# Patient Record
Sex: FEMALE | Race: WHITE | URBAN - METROPOLITAN AREA
[De-identification: names, ages, dates, MRNs, and addresses within clinical notes are randomized per-mention and may not be internally consistent; named-entity substitution may affect disease eponyms.]

---

## 2022-04-12 ENCOUNTER — OFFICE VISIT (OUTPATIENT)
Dept: URBAN - METROPOLITAN AREA CLINIC 23 | Facility: CLINIC | Age: 52
End: 2022-04-12

## 2022-04-12 ENCOUNTER — OFFICE VISIT (OUTPATIENT)
Dept: URBAN - METROPOLITAN AREA CLINIC 28 | Facility: CLINIC | Age: 52
End: 2022-04-12

## 2022-04-12 DIAGNOSIS — H33.011 RETINAL DETACHMENT WITH SINGLE BREAK, RIGHT EYE: Primary | ICD-10-CM

## 2022-04-12 DIAGNOSIS — H33.031 RETINAL DETACHMENT WITH GIANT RETINAL TEAR, RIGHT EYE: ICD-10-CM

## 2022-04-12 DIAGNOSIS — H33.052 TOTAL RETINAL DETACHMENT, LEFT EYE: Primary | ICD-10-CM

## 2022-04-12 DIAGNOSIS — H25.13 AGE-RELATED NUCLEAR CATARACT, BILATERAL: ICD-10-CM

## 2022-04-12 PROCEDURE — 92134 CPTRZ OPH DX IMG PST SGM RTA: CPT | Performed by: OPTOMETRIST

## 2022-04-12 PROCEDURE — 92134 CPTRZ OPH DX IMG PST SGM RTA: CPT | Performed by: OPHTHALMOLOGY

## 2022-04-12 PROCEDURE — 92004 COMPRE OPH EXAM NEW PT 1/>: CPT | Performed by: OPTOMETRIST

## 2022-04-12 PROCEDURE — 92004 COMPRE OPH EXAM NEW PT 1/>: CPT | Performed by: OPHTHALMOLOGY

## 2022-04-12 RX ORDER — PREDNISOLONE ACETATE 10 MG/ML
1 % SUSPENSION/ DROPS OPHTHALMIC
Qty: 10 | Refills: 5 | Status: ACTIVE
Start: 2022-04-12

## 2022-04-12 RX ORDER — ALBUTEROL SULFATE 5 MG/ML
SOLUTION RESPIRATORY (INHALATION)
Qty: 0 | Refills: 0 | Status: ACTIVE
Start: 2022-04-12

## 2022-04-12 RX ORDER — OFLOXACIN 3 MG/ML
0.3 % SOLUTION/ DROPS OPHTHALMIC
Qty: 5 | Refills: 3 | Status: ACTIVE
Start: 2022-04-12

## 2022-04-12 ASSESSMENT — INTRAOCULAR PRESSURE
OS: 17
OD: 17
OD: 16

## 2022-04-12 ASSESSMENT — KERATOMETRY
OS: 43.63
OD: 43.63

## 2022-04-12 NOTE — IMPRESSION/PLAN
Impression: Retinal detachment with giant retinal tear, right eye: H33.031. Plan: Educated extensively on exam findings. Educated on RD OD and beginning of macula off presentation. Educated on importance of immediate/urgent treatment with retina.

## 2022-04-12 NOTE — IMPRESSION/PLAN
Impression: Total retinal detachment, left eye: H33.052. Plan: Educated extensively on exam findings. Educated on total retinal detachment OS and impact on vision. Immediate/urgent referral to retina.

## 2022-04-12 NOTE — IMPRESSION/PLAN
Impression: Total retinal detachment, left eye: H33.052. Left. Condition: unstable. Vision: vision affected. Longstanding RD - per pt decrease vision since end of July 2021 Plan: Discussed diagnosis in detail with patient. Discussed risks of progression. Discussed treatment options with patient. Recommend surgery to repair the Retinal Detachment Left eye however due to recent RD OD will proceed with surgery OD first, will consider surgery OS once OD is stable. OCT OS unable to obtain and Optos OS shows Total RD.

## 2022-04-12 NOTE — IMPRESSION/PLAN
Impression: Age-related nuclear cataract, bilateral: H25.13. Bilateral. Condition: new prob, no addtl w/u needed. Plan: Discussed diagnosis in detail with patient. No treatment is required at this time. Will continue to observe condition and or symptoms. Advise patient she may need Cataract surgery sooner than later post retina surgery.

## 2022-04-12 NOTE — IMPRESSION/PLAN
Impression: Retinal detachment with single break, right eye: H33.011. Right. Condition: new problem addtl w/u needed. Vision: vision affected. Plan: Discussed diagnosis in detail with patient. Discussed risks of progression. Surgical treatment is recommended to repair the retina PPVx RIGHT EYE for possible vision improvement. Discussed surgery with Gas vs S. O. Patient states she needs to be able to see. Recommend surgery OD with S. O. Surgical risks and benefits were discussed, explained and understood by patient. Unable to tell how much vision will be recovered. Advise patient that once the retina is stable post surgery, she will need additional surgery to remove the S. O. All questions answered. Patient elects to proceed with recommendation. RL1. Educational material provided to patient. OCT O D shows macula off RD and Optos OD shows sup temp RD. Erx Prednisolone and Ofloxacin. Proceed with emergency surgery OD today. Anastacio Green

## 2022-04-13 ENCOUNTER — POST-OPERATIVE VISIT (OUTPATIENT)
Dept: URBAN - METROPOLITAN AREA CLINIC 23 | Facility: CLINIC | Age: 52
End: 2022-04-13

## 2022-04-13 DIAGNOSIS — Z48.810 ENCOUNTER FOR SURGICAL AFTERCARE FOLLOWING SURGERY ON A SENSE ORGAN: Primary | ICD-10-CM

## 2022-04-13 PROCEDURE — 99024 POSTOP FOLLOW-UP VISIT: CPT | Performed by: OPTOMETRIST

## 2022-04-13 NOTE — IMPRESSION/PLAN
Impression: S/P Pars Plana Vitrectomy 25ga; AFX (Air Fluid Gas Exchange); Endo laser; Intraocular Injections of silicone oil - Right Eye OD - 1 Day. Encounter for surgical aftercare following surgery on a sense organ  Z48.810. Excellent post op course   Post operative instructions reviewed - Plan: Pt edu. Appropriate appearance and IOP. Call with any issues. rtc 1 wk PO. --Advised patient to use artificial tears for comfort.

## 2022-04-21 ENCOUNTER — POST-OPERATIVE VISIT (OUTPATIENT)
Dept: URBAN - METROPOLITAN AREA CLINIC 23 | Facility: CLINIC | Age: 52
End: 2022-04-21

## 2022-04-21 DIAGNOSIS — Z48.810 ENCOUNTER FOR SURGICAL AFTERCARE FOLLOWING SURGERY ON A SENSE ORGAN: Primary | ICD-10-CM

## 2022-04-21 PROCEDURE — 99024 POSTOP FOLLOW-UP VISIT: CPT | Performed by: OPHTHALMOLOGY

## 2022-04-21 ASSESSMENT — INTRAOCULAR PRESSURE
OD: 16
OS: 16

## 2022-04-21 NOTE — IMPRESSION/PLAN
Impression: S/P Pars Plana Vitrectomy 25ga; AFX (Air Fluid Gas Exchange); Endo laser; Intraocular Injections of silicone oil - Right Eye OD - 9 Days. Encounter for surgical aftercare following surgery on a sense organ  Z48.810. Excellent post op course   Post operative instructions reviewed - Condition is improving - Plan: Patient states she is happy because she can see with her right eye. OK to proceed with a refraction to update OD only to help her see until the S. O. is removed. Will consider removing the Oil in 3 - 4 mos and may remove the Cataract OD at the same time. Continue using Prednisolone OD QID until gone. OCT OD shows the retina is attached confirmed by Optos OD. H33.052 Total Retinal Detachment, LEFT EYE Discussed RD OS, patient had long standing RD in the left eye is the reason why proceeding with surgery OD was best decision because the RD was freshly new. Advise patient she may need S. O. in the left eye to repair the retina in the future, maybe gas could be possible. Patient states she prefers S. O. OS. Discussed surgery in great detail with risks and benefits. All questions answered. RL1. Will schedule PPVX, EL, AFx, ERMx, S. O., Josiah HOANG LEFT EYE. Erx Prednisolone OS QID to her pharmacy (Ofloxacin pt has refills at her pharmacy). Advise patient this will be a Complex surgery OS. OCT OS unable to obtain and Optos OS confirms Total RD.

## 2022-04-26 ENCOUNTER — SURGERY (OUTPATIENT)
Dept: URBAN - METROPOLITAN AREA SURGERY 11 | Facility: SURGERY | Age: 52
End: 2022-04-26

## 2022-04-26 PROCEDURE — 67113 REPAIR RETINAL DETACH CPLX: CPT | Performed by: OPHTHALMOLOGY

## 2022-04-27 ENCOUNTER — POST-OPERATIVE VISIT (OUTPATIENT)
Dept: URBAN - METROPOLITAN AREA CLINIC 23 | Facility: CLINIC | Age: 52
End: 2022-04-27

## 2022-04-27 DIAGNOSIS — Z48.810 ENCOUNTER FOR SURGICAL AFTERCARE FOLLOWING SURGERY ON A SENSE ORGAN: Primary | ICD-10-CM

## 2022-04-27 PROCEDURE — 99024 POSTOP FOLLOW-UP VISIT: CPT | Performed by: OPTOMETRIST

## 2022-04-27 ASSESSMENT — INTRAOCULAR PRESSURE
OD: 15
OS: 17

## 2022-04-27 NOTE — IMPRESSION/PLAN
Impression: S/P PPVx, EL, ERMx, AFX, ILMx, 25g, PFO Kenalog, Silicone Oil Injection- 34974 OS - 1 Day. Encounter for surgical aftercare following surgery on a sense organ  Z48.810. Plan: Pt edu. Appropriate appearance and IOP. Start Pred Acetate QID OS and continue Ofloxacin QID OS. Continue drops OD as prescribed. Advised patient to call with any issues.  RTC as scheduled with Dr. Linnette Sneed.

## 2022-04-28 ENCOUNTER — TESTING ONLY (OUTPATIENT)
Dept: URBAN - METROPOLITAN AREA CLINIC 23 | Facility: CLINIC | Age: 52
End: 2022-04-28

## 2022-04-28 DIAGNOSIS — H52.03 HYPERMETROPIA, BILATERAL: Primary | ICD-10-CM

## 2022-04-28 ASSESSMENT — VISUAL ACUITY
OS: 20/200
OD: 20/80

## 2022-04-28 ASSESSMENT — KERATOMETRY
OD: 43.63
OS: 43.50

## 2022-05-13 ENCOUNTER — POST-OPERATIVE VISIT (OUTPATIENT)
Dept: URBAN - METROPOLITAN AREA CLINIC 23 | Facility: CLINIC | Age: 52
End: 2022-05-13

## 2022-05-13 PROCEDURE — 99024 POSTOP FOLLOW-UP VISIT: CPT | Performed by: OPHTHALMOLOGY

## 2022-05-13 ASSESSMENT — INTRAOCULAR PRESSURE
OS: 20
OD: 20

## 2022-05-13 NOTE — IMPRESSION/PLAN
Impression: S/P PPVx, EL, ERMx, AFX, ILMx, 25g, PFO Kenalog, Silicone Oil Injection- [id number] OS - 17 Days. Encounter for surgical aftercare following surgery on a sense organ  Z48.810. Excellent post op course   Condition is improving - Plan: Exam OD shows retina is fully attached and Exam OS shows fluid inferiorly - partial reattachment. Optos OD shows mild heme peripheral to iridotomy laser and Optos OS shows retina has improved. Will continue to observe and let the left eye heal further, once stable will consider surgery. Continue Prednisolone. Recommend a retina f/u in 3 weeks.

## 2022-06-06 ENCOUNTER — POST-OPERATIVE VISIT (OUTPATIENT)
Dept: URBAN - METROPOLITAN AREA CLINIC 23 | Facility: CLINIC | Age: 52
End: 2022-06-06

## 2022-06-06 DIAGNOSIS — Z48.810 ENCOUNTER FOR SURGICAL AFTERCARE FOLLOWING SURGERY ON A SENSE ORGAN: Primary | ICD-10-CM

## 2022-06-06 PROCEDURE — 99024 POSTOP FOLLOW-UP VISIT: CPT | Performed by: OPHTHALMOLOGY

## 2022-06-06 ASSESSMENT — INTRAOCULAR PRESSURE
OD: 17
OS: 17

## 2022-06-06 NOTE — IMPRESSION/PLAN
Impression: S/P PPVx, EL, ERMx, AFX, ILMx, 25g, PFO Kenalog, Silicone Oil Injection- 97782 OS - 41 Days. Encounter for surgical aftercare following surgery on a sense organ  Z48.810. Excellent post op course   Condition is improving - Plan: Exam OD shows retina is attached w/ heavy liquid. Exam OS shows fluid inferiorly - partial reattachment. Discussed surgery to remove heavy liquid OD and place an IOL implant. In addition, discussed surgery to remove heavy liquid and reinforce the retina w/ IOL implant OS. Difficult to interpret OCT OU. Optos OD is holding. Decrease Prednisolone to BID OD and continue Prednisolone QID OS. Will reassess in 1 month.

## 2022-07-08 ENCOUNTER — POST-OPERATIVE VISIT (OUTPATIENT)
Dept: URBAN - METROPOLITAN AREA CLINIC 23 | Facility: CLINIC | Age: 52
End: 2022-07-08

## 2022-07-08 DIAGNOSIS — Z48.810 ENCOUNTER FOR SURGICAL AFTERCARE FOLLOWING SURGERY ON A SENSE ORGAN: Primary | ICD-10-CM

## 2022-07-08 PROCEDURE — 99024 POSTOP FOLLOW-UP VISIT: CPT | Performed by: OPHTHALMOLOGY

## 2022-07-08 ASSESSMENT — INTRAOCULAR PRESSURE
OD: 20
OS: 20

## 2022-09-02 ENCOUNTER — OFFICE VISIT (OUTPATIENT)
Dept: URBAN - METROPOLITAN AREA CLINIC 23 | Facility: CLINIC | Age: 52
End: 2022-09-02

## 2022-09-02 DIAGNOSIS — H33.011 RETINAL DETACHMENT WITH SINGLE BREAK, RIGHT EYE: Primary | ICD-10-CM

## 2022-09-02 DIAGNOSIS — H25.11 AGE-RELATED NUCLEAR CATARACT, RIGHT EYE: ICD-10-CM

## 2022-09-02 PROCEDURE — 92134 CPTRZ OPH DX IMG PST SGM RTA: CPT | Performed by: OPHTHALMOLOGY

## 2022-09-02 PROCEDURE — 92014 COMPRE OPH EXAM EST PT 1/>: CPT | Performed by: OPHTHALMOLOGY

## 2022-09-02 RX ORDER — PREDNISOLONE ACETATE 10 MG/ML
1 % SUSPENSION/ DROPS OPHTHALMIC
Qty: 10 | Refills: 4 | Status: ACTIVE
Start: 2022-09-02

## 2022-09-02 ASSESSMENT — INTRAOCULAR PRESSURE
OS: 31
OD: 25

## 2022-09-02 NOTE — IMPRESSION/PLAN
Impression: Age-related nuclear cataract, right eye: H25.11. Right. Condition: severe. Plan: Discussed diagnosis in detail with patient. Discussed risks of progression. Recommend surgery OD - see notes above.

## 2022-09-02 NOTE — IMPRESSION/PLAN
Impression: Retinal detachment with single break, right eye: H33.011. Right. Condition: new problem addtl w/u needed. Vision: vision affected. Plan: Discussed diagnosis in detail with patient. Discussed risks of progression. Surgical treatment is recommended to remove the silicone oil and the cataract for vision improvement PPVx w/ lensectomy OD. Will implant an IOL. Will need an ASCAN w/ Oil Settings prior to surgery. Surgical risks and benefits were discussed, explained and understood by patient. All questions answered. RL1. Patient elects to proceed with recommendation. OCT OD performed today: shows the retina to be attached centrally, Optos OD shows the retina to be attached w/ S.O. Sent Refills for Prednisolone to the pharmacy, pt states she has a new bottle of Ofloxacin at home.   
**ASCAN w/ OIL SETTINGS needed prior to surgery**

## 2022-09-15 ENCOUNTER — PRE-OPERATIVE VISIT (OUTPATIENT)
Dept: URBAN - METROPOLITAN AREA CLINIC 23 | Facility: CLINIC | Age: 52
End: 2022-09-15

## 2022-09-15 DIAGNOSIS — H25.11 AGE-RELATED NUCLEAR CATARACT, RIGHT EYE: Primary | ICD-10-CM

## 2022-09-15 ASSESSMENT — PACHYMETRY
OS: 24.16
OS: 2.42
OD: 25.62
OD: 3.25

## 2022-09-22 ENCOUNTER — POST-OPERATIVE VISIT (OUTPATIENT)
Dept: URBAN - METROPOLITAN AREA CLINIC 16 | Facility: CLINIC | Age: 52
End: 2022-09-22

## 2022-09-22 DIAGNOSIS — Z48.810 ENCOUNTER FOR SURGICAL AFTERCARE FOLLOWING SURGERY ON A SENSE ORGAN: Primary | ICD-10-CM

## 2022-09-22 PROCEDURE — 99024 POSTOP FOLLOW-UP VISIT: CPT | Performed by: OPTOMETRIST

## 2022-09-22 ASSESSMENT — INTRAOCULAR PRESSURE
OS: 18
OD: 18

## 2022-09-22 NOTE — IMPRESSION/PLAN
Impression: S/P [id number]; 25G Posterior Vitrectomy; [id number]; AFX (Air Fluid Gas Exchange); Limbal Lensectomy: PC IOL; [id number]; Intravitreal injection of medications: Kenalog; Silicon Oil Removal OD - 1 Day. Encounter for surgical aftercare following surgery on a sense organ  Z48.810. Plan: RTC as scheduled w/Dr. Khushbu Jean-Baptiste. --Continue all meds as directed.

## 2022-09-30 ENCOUNTER — POST-OPERATIVE VISIT (OUTPATIENT)
Dept: URBAN - METROPOLITAN AREA CLINIC 23 | Facility: CLINIC | Age: 52
End: 2022-09-30

## 2022-09-30 DIAGNOSIS — Z48.810 ENCOUNTER FOR SURGICAL AFTERCARE FOLLOWING SURGERY ON A SENSE ORGAN: Primary | ICD-10-CM

## 2022-09-30 PROCEDURE — 99024 POSTOP FOLLOW-UP VISIT: CPT | Performed by: OPHTHALMOLOGY

## 2022-09-30 RX ORDER — PREDNISOLONE ACETATE 10 MG/ML
1 % SUSPENSION/ DROPS OPHTHALMIC
Qty: 10 | Refills: 5 | Status: ACTIVE
Start: 2022-09-30

## 2022-09-30 ASSESSMENT — INTRAOCULAR PRESSURE
OD: 19
OS: 24

## 2022-09-30 NOTE — IMPRESSION/PLAN
Impression: S/P [id number]; 25G Posterior Vitrectomy; [id number]; AFX (Air Fluid Gas Exchange); Limbal Lensectomy: PC IOL; [id number]; Intravitreal injection of medications: Kenalog; Silicon Oil Removal OD - 9 Days. Encounter for surgical aftercare following surgery on a sense organ  Z48.810. Excellent post op course   Post operative instructions reviewed - Condition is improving - Plan: Exam OD shows corneal edema and IOL OD is cloudy however the retina is attached that is why the vision is blurry. Condition should continue to improve in time. Recommend to increase Prednisolone to q 1 - 2 hrs wa. Recommend a follow - up in 3 wks. (Continue  with PF OS 2 - 3 x's a day). OCT OD shows a cloudy view.

## 2022-10-24 ENCOUNTER — OFFICE VISIT (OUTPATIENT)
Dept: URBAN - METROPOLITAN AREA CLINIC 23 | Facility: CLINIC | Age: 52
End: 2022-10-24

## 2022-10-24 DIAGNOSIS — H33.011 RETINAL DETACHMENT WITH SINGLE BREAK, RIGHT EYE: ICD-10-CM

## 2022-10-24 DIAGNOSIS — H26.491 OTHER SECONDARY CATARACT, RIGHT EYE: ICD-10-CM

## 2022-10-24 DIAGNOSIS — H25.12 AGE-RELATED NUCLEAR CATARACT, LEFT EYE: ICD-10-CM

## 2022-10-24 DIAGNOSIS — H33.052 TOTAL RETINAL DETACHMENT, LEFT EYE: Primary | ICD-10-CM

## 2022-10-24 PROCEDURE — 92134 CPTRZ OPH DX IMG PST SGM RTA: CPT | Performed by: OPHTHALMOLOGY

## 2022-10-24 PROCEDURE — 99213 OFFICE O/P EST LOW 20 MIN: CPT | Performed by: OPHTHALMOLOGY

## 2022-10-24 RX ORDER — OFLOXACIN 3 MG/ML
0.3 % SOLUTION/ DROPS OPHTHALMIC
Qty: 5 | Refills: 3 | Status: ACTIVE
Start: 2022-10-24

## 2022-10-24 ASSESSMENT — INTRAOCULAR PRESSURE
OS: 29
OD: 18

## 2022-10-24 NOTE — IMPRESSION/PLAN
Impression: Other secondary cataract, right eye: H26.491. Right. Condition: established, worsening. Plan: Discussed diagnosis in detail with patient. Recommend Yag Laser treatment for vision improvement. Discussed risks/benefits of laser TX OD. All questions answered. Patient elects to proceed with recommendation.  RL1

## 2022-10-24 NOTE — IMPRESSION/PLAN
Impression: s/p PPV for Retinal detachment with single break, right eye: H33.011. Right. Condition: s/p PPV, EL, LL, PCIOL, S.O removal OD 09/21/22 Plan: Discussed diagnosis in detail with patient. Exam and Optos OD shows the retina to be attached and healing well post surgery. No treatment is required at this time. Will continue to observe condition and or symptoms. Pt to continue Pred QID OD.

## 2022-10-24 NOTE — IMPRESSION/PLAN
Impression: Total retinal detachment, left eye: H33.052. Left. Condition: unstable. Vision: vision affected. Longstanding RD - per pt decrease vision since end of July 2021
s/p PPV, EL, ERMx, AFGx, S.O., MATHEW OS 04/26/22 Plan: Discussed diagnosis in great detail with patient. Discussed risks of progression. Surgical treatment is recommended to remove the cataract, remove the Silicone Oil, and repair the retina and then insert Silicone Oil PPVx, Lensectomy OD. Patient will need a second surgery in the future when the retina is stable to remove the heavy liquid. Discussed the possibility of implanting an IOL at the time or surgery vs waiting until the eye heals more and then IOL implant. Will decide which way to proceed at the time of surgery. [facility] w/ Oil settings on file). All questions answered. RL1. Drops Erx to pharmacy on file.

## 2022-10-24 NOTE — IMPRESSION/PLAN
Impression: Age-related nuclear cataract, left eye: H25.12. Left. Condition: established, worsening. Plan: Discussed diagnosis in detail with patient. Discussed risks of progression. Recommend surgery OS - see notes above.

## 2023-02-14 ENCOUNTER — OFFICE VISIT (OUTPATIENT)
Dept: URBAN - METROPOLITAN AREA CLINIC 23 | Facility: CLINIC | Age: 53
End: 2023-02-14

## 2023-02-14 ENCOUNTER — SURGERY (OUTPATIENT)
Dept: URBAN - METROPOLITAN AREA SURGERY 11 | Facility: SURGERY | Age: 53
End: 2023-02-14

## 2023-02-14 DIAGNOSIS — H33.011 RETINAL DETACHMENT WITH SINGLE BREAK, RIGHT EYE: Primary | ICD-10-CM

## 2023-02-14 DIAGNOSIS — H26.491 OTHER SECONDARY CATARACT, RIGHT EYE: ICD-10-CM

## 2023-02-14 PROCEDURE — 66821 AFTER CATARACT LASER SURGERY: CPT | Performed by: OPHTHALMOLOGY

## 2023-02-14 PROCEDURE — 99213 OFFICE O/P EST LOW 20 MIN: CPT | Performed by: OPHTHALMOLOGY

## 2023-02-14 PROCEDURE — 76512 OPH US DX B-SCAN: CPT | Performed by: OPHTHALMOLOGY

## 2023-02-14 PROCEDURE — 92134 CPTRZ OPH DX IMG PST SGM RTA: CPT | Performed by: OPHTHALMOLOGY

## 2023-02-14 ASSESSMENT — INTRAOCULAR PRESSURE
OD: 27
OS: 30
OD: 27
OS: 30

## 2023-02-14 NOTE — IMPRESSION/PLAN
Impression: s/p PPVx for Repair of Retinal detachment with single break, right eye: H33.011. Right. Condition: stable. Vision: vision affected. s/p PPVx for Removal od S. O., Limbal Lensectomy, IOL Implant OD 09/21/22
s/p PPvx for Repair of RD OD w/SO. 4/12/2022 Plan: Discussed diagnosis in detail with patient. Exam OD shows a poor view of the fundus, will proceed with B-Scan to assess the retina. B-Scan OD shows the retina is attached. Patient asked why her vision has changed and / or decreased. Explained to patient that it's difficult to see in the same way she can't see out therefore recommend to proceed with the Yag OD as previously discussed on DOS 10/24/2022. Patient elects to proceed as well. OCT unable to obtain OD, Optos OD hazy view.

## 2023-02-14 NOTE — IMPRESSION/PLAN
Impression: Other secondary cataract, right eye: H26.491. Right. Condition: established, worsening. Plan: Discussed diagnosis in detail with patient. Retina exam shows a hazy view however B-Scan OD shows the retina is attached. Will proceed with Yag OD today as previously discussed on DOS 10/24/22 - (see notes above).

## 2023-02-23 ENCOUNTER — POST-OPERATIVE VISIT (OUTPATIENT)
Dept: URBAN - METROPOLITAN AREA CLINIC 23 | Facility: CLINIC | Age: 53
End: 2023-02-23

## 2023-02-23 DIAGNOSIS — Z48.810 ENCOUNTER FOR SURGICAL AFTERCARE FOLLOWING SURGERY ON A SENSE ORGAN: ICD-10-CM

## 2023-02-23 DIAGNOSIS — H33.011 RETINAL DETACHMENT WITH SINGLE BREAK, RIGHT EYE: ICD-10-CM

## 2023-02-23 DIAGNOSIS — H33.052 TOTAL RETINAL DETACHMENT, LEFT EYE: Primary | ICD-10-CM

## 2023-02-23 DIAGNOSIS — H25.12 AGE-RELATED NUCLEAR CATARACT, LEFT EYE: ICD-10-CM

## 2023-02-23 PROCEDURE — 92134 CPTRZ OPH DX IMG PST SGM RTA: CPT | Performed by: OPHTHALMOLOGY

## 2023-02-23 PROCEDURE — 92014 COMPRE OPH EXAM EST PT 1/>: CPT | Performed by: OPHTHALMOLOGY

## 2023-02-23 ASSESSMENT — INTRAOCULAR PRESSURE
OS: 26
OD: 20

## 2023-02-23 NOTE — IMPRESSION/PLAN
Impression: s/p PPV for Retinal detachment with single break, right eye: H33.011. Right. Condition: stable post surgery. Vision: vision affected. s/p PPV, EL, S.O OD 04/12/22 w/ Dr Elizabeth Troy
s/p PPV, AFGx, Limbal Lensectomy, PC IOL, MATHEW S.O removal OD 09/21/22 w/ Dr Burnett Po: Discussed diagnosis in detail with patient. Exam and Optos OD shows stable and attached post retina surgery. , much clear view s/p YAG OD. No treatment is required at this time for the right eye.

## 2023-02-23 NOTE — IMPRESSION/PLAN
Impression: Encounter for surgical aftercare following surgery on a sense organ: Z48.810. Right. Plan: Discussed diagnosis in detail with patient. Exam and Optos shows a much more clear view of the retina. Vision is greatly improved on today's exam.   No treatment is required at this time. OCT stable OD.

## 2023-02-23 NOTE — IMPRESSION/PLAN
Impression: Total retinal detachment, left eye: H33.052. Left. Condition: unstable. Vision: vision affected. Longstanding RD - per pt decrease vision since end of July 2021
s/p PPV, EL, ERMx, AFGx, S.O., MATHEW OS 04/26/22 Plan: Discussed diagnosis in great detail with patient. Discussed risks of progression. Surgical treatment is recommended to remove the cataract, remove the Silicone Oil, Possible PFO insertion PPVx, Lensectomy OS. Patient will need a second surgery in the future when the retina is stable to remove the heavy liquid, if used. Discussed the possibility of implanting an IOL at the time or surgery vs waiting until the eye heals more and then IOL implant. Will decide which way to proceed at the time of surgery[facility] All questions answered. RL1.   Pt has drops with refills at home, will not send per patient request.

Dr Brandon William chose [facility] LI31SE 18.5

## 2023-03-28 ENCOUNTER — SURGERY (OUTPATIENT)
Dept: URBAN - METROPOLITAN AREA SURGERY 11 | Facility: SURGERY | Age: 53
End: 2023-03-28

## 2023-03-28 PROCEDURE — 67113 REPAIR RETINAL DETACH CPLX: CPT | Performed by: OPHTHALMOLOGY

## 2023-03-29 ENCOUNTER — POST-OPERATIVE VISIT (OUTPATIENT)
Dept: URBAN - METROPOLITAN AREA CLINIC 23 | Facility: CLINIC | Age: 53
End: 2023-03-29

## 2023-03-29 DIAGNOSIS — Z48.810 ENCOUNTER FOR SURGICAL AFTERCARE FOLLOWING SURGERY ON A SENSE ORGAN: Primary | ICD-10-CM

## 2023-03-29 PROCEDURE — 99024 POSTOP FOLLOW-UP VISIT: CPT | Performed by: OPTOMETRIST

## 2023-03-29 RX ORDER — PREDNISOLONE ACETATE 10 MG/ML
1 % SUSPENSION/ DROPS OPHTHALMIC
Qty: 10 | Refills: 5 | Status: ACTIVE
Start: 2023-03-29

## 2023-03-29 RX ORDER — OFLOXACIN 3 MG/ML
0.3 % SOLUTION/ DROPS OPHTHALMIC
Qty: 5 | Refills: 3 | Status: ACTIVE
Start: 2023-03-29

## 2023-03-29 ASSESSMENT — INTRAOCULAR PRESSURE
OD: 20
OS: 19

## 2023-03-29 NOTE — IMPRESSION/PLAN
Impression: S/P Cataract Extraction by phacoemulsification; Silicon Oil Removal; Pars Plana Vitrectomy 25sy - 87497; Intravitreal Injection of Perfluoron; Endo laser OS - 1 Day. Encounter for surgical aftercare following surgery on a sense organ  Z48.810. Post operative instructions reviewed - Plan: Use medication as directed above and on handout. Return if vision suddenly changes or pain increases.

## 2023-04-04 ENCOUNTER — POST-OPERATIVE VISIT (OUTPATIENT)
Dept: URBAN - METROPOLITAN AREA CLINIC 23 | Facility: CLINIC | Age: 53
End: 2023-04-04

## 2023-04-04 DIAGNOSIS — Z48.810 ENCOUNTER FOR SURGICAL AFTERCARE FOLLOWING SURGERY ON A SENSE ORGAN: Primary | ICD-10-CM

## 2023-04-04 PROCEDURE — 99024 POSTOP FOLLOW-UP VISIT: CPT | Performed by: OPHTHALMOLOGY

## 2023-04-04 RX ORDER — FLUOROMETHOLONE ACETATE 1 MG/ML
0.1 % SUSPENSION/ DROPS OPHTHALMIC
Qty: 5 | Refills: 5 | Status: ACTIVE
Start: 2023-04-04

## 2023-04-04 RX ORDER — BRIMONIDINE TARTRATE, TIMOLOL MALEATE 2; 5 MG/ML; MG/ML
SOLUTION/ DROPS OPHTHALMIC
Qty: 5 | Refills: 4 | Status: ACTIVE
Start: 2023-04-04

## 2023-04-04 ASSESSMENT — INTRAOCULAR PRESSURE
OD: 18
OS: 28

## 2023-04-04 NOTE — IMPRESSION/PLAN
Impression: S/P Cataract Extraction by phacoemulsification; Silicon Oil Removal; Pars Plana Vitrectomy 25ga - 48087; Intravitreal Injection of Perfluoron; Endo laser OS - 7 Days. Encounter for surgical aftercare following surgery on a sense organ  Z48.810. Plan: Unable to obtain OCT OS and Optos OS shows a hazy view. Exam OS shows good reflex, retina appears grossly attached. Explained both heavy liquid and a small amount of gas bubble was placed during surgery due to location of tear which was inferiorly. Will consider lens implant once the retina is stable. IOP elevated upon exam today (28). Recommend starting Flarex QID OS and Combigan TID OS. D/c Ofloxacin after today and d/c Prednisolone. Ok to sleep on left side w/ head elevated. Ok to take off green bracelet.  Will reassess the retina in 2 weeks - consider PFO removal.

## 2023-04-17 ENCOUNTER — POST-OPERATIVE VISIT (OUTPATIENT)
Dept: URBAN - METROPOLITAN AREA CLINIC 33 | Facility: CLINIC | Age: 53
End: 2023-04-17

## 2023-04-17 DIAGNOSIS — Z48.810 ENCOUNTER FOR SURGICAL AFTERCARE FOLLOWING SURGERY ON A SENSE ORGAN: Primary | ICD-10-CM

## 2023-04-17 PROCEDURE — 99024 POSTOP FOLLOW-UP VISIT: CPT | Performed by: OPHTHALMOLOGY

## 2023-04-17 ASSESSMENT — INTRAOCULAR PRESSURE
OS: 20
OD: 22

## 2023-04-17 NOTE — IMPRESSION/PLAN
Impression: S/P Cataract Extraction by phacoemulsification; Silicon Oil Removal; Pars Plana Vitrectomy 25ga - 88006; Intravitreal Injection of Perfluoron; Endo laser OS - 20 Days. Encounter for surgical aftercare following surgery on a sense organ  Z48.810. Plan: OCT OD shows mild ILM change and OCT OS shows stable centrally. Optos OD shows intact w/ bubbles and Optos OS shows stable. Continue Combigan TID OS and Flarex QID OS. Recommend a retina f/u in 2 weeks - sooner if any changes.

## 2023-05-02 ENCOUNTER — POST-OPERATIVE VISIT (OUTPATIENT)
Dept: URBAN - METROPOLITAN AREA CLINIC 23 | Facility: CLINIC | Age: 53
End: 2023-05-02

## 2023-05-02 DIAGNOSIS — Z48.810 ENCOUNTER FOR SURGICAL AFTERCARE FOLLOWING SURGERY ON A SENSE ORGAN: Primary | ICD-10-CM

## 2023-05-02 PROCEDURE — 99024 POSTOP FOLLOW-UP VISIT: CPT | Performed by: OPHTHALMOLOGY

## 2023-05-02 ASSESSMENT — INTRAOCULAR PRESSURE
OD: 13
OS: 13

## 2023-05-02 NOTE — IMPRESSION/PLAN
Impression: S/P Cataract Extraction by phacoemulsification; Silicon Oil Removal; Pars Plana Vitrectomy 25ga - 18572; Intravitreal Injection of Perfluoron; Endo laser OS - 35 Days. Encounter for surgical aftercare following surgery on a sense organ  Z48.810. Plan: Exam OS shows the retina is in good position with Perfl. Instilled 1 qtt of Proparacaine 0.5% OS into the left eye and removed suture with no complications. Continue with Flarex OS QID and d/c Combigan OS TID. Discussed removal of PFO. Discussed surgery OS in great detail. , will remove PFO for possible vision improvement. Discussed the risks and benefits of sx. All questions answered. Patient elects to proceed with recommendation. RL1. OCT OS unable to obtain and Optos OS shows a hazy view due to Perfl. Will consider a Lens Implant OS in the future. Schedule PPVx 25 ga, AFx, Perf removal, Kenalog LEFT EYE.
(pt can proceed with a refraction for OD).

## 2023-08-01 ENCOUNTER — SURGERY (OUTPATIENT)
Dept: URBAN - METROPOLITAN AREA SURGERY 11 | Facility: SURGERY | Age: 53
End: 2023-08-01

## 2023-08-01 PROCEDURE — 67041 VIT FOR MACULAR PUCKER: CPT | Performed by: OPHTHALMOLOGY

## 2023-08-02 ENCOUNTER — POST-OPERATIVE VISIT (OUTPATIENT)
Dept: URBAN - METROPOLITAN AREA CLINIC 23 | Facility: CLINIC | Age: 53
End: 2023-08-02

## 2023-08-02 DIAGNOSIS — Z48.810 ENCOUNTER FOR SURGICAL AFTERCARE FOLLOWING SURGERY ON A SENSE ORGAN: Primary | ICD-10-CM

## 2023-08-02 PROCEDURE — 99024 POSTOP FOLLOW-UP VISIT: CPT | Performed by: OPTOMETRIST

## 2023-08-02 ASSESSMENT — INTRAOCULAR PRESSURE
OS: 2
OS: 21
OD: 20

## 2023-08-08 ENCOUNTER — POST-OPERATIVE VISIT (OUTPATIENT)
Dept: URBAN - METROPOLITAN AREA CLINIC 23 | Facility: CLINIC | Age: 53
End: 2023-08-08

## 2023-08-08 DIAGNOSIS — Z48.810 ENCOUNTER FOR SURGICAL AFTERCARE FOLLOWING SURGERY ON A SENSE ORGAN: Primary | ICD-10-CM

## 2023-08-08 PROCEDURE — 99024 POSTOP FOLLOW-UP VISIT: CPT | Performed by: OPHTHALMOLOGY

## 2023-08-08 ASSESSMENT — INTRAOCULAR PRESSURE
OS: 15
OD: 18